# Patient Record
Sex: FEMALE | Race: WHITE | Employment: OTHER | URBAN - METROPOLITAN AREA
[De-identification: names, ages, dates, MRNs, and addresses within clinical notes are randomized per-mention and may not be internally consistent; named-entity substitution may affect disease eponyms.]

---

## 2017-12-21 ENCOUNTER — HOSPITAL ENCOUNTER (OUTPATIENT)
Dept: MRI IMAGING | Age: 72
Discharge: HOME | End: 2017-12-21
Payer: MEDICARE

## 2017-12-21 VITALS
RESPIRATION RATE: 18 BRPM | DIASTOLIC BLOOD PRESSURE: 74 MMHG | SYSTOLIC BLOOD PRESSURE: 149 MMHG | HEART RATE: 68 BPM | OXYGEN SATURATION: 95 %

## 2017-12-21 DIAGNOSIS — M54.16 LUMBAR RADICULOPATHY: ICD-10-CM

## 2017-12-21 DIAGNOSIS — M54.50 LOW BACK PAIN: ICD-10-CM

## 2017-12-21 PROCEDURE — 72148 MRI LUMBAR SPINE W/O DYE: CPT

## 2017-12-21 NOTE — PROGRESS NOTES
[employer] interrogated pacemaker before and after lumbar MRI. Patient monitored throughout. Denied any pain or shortness of breath.

## 2019-01-07 ENCOUNTER — HOSPITAL ENCOUNTER (OUTPATIENT)
Dept: MRI IMAGING | Age: 74
Discharge: HOME | End: 2019-01-07
Attending: ORTHOPAEDIC SURGERY
Payer: MEDICARE

## 2019-01-07 DIAGNOSIS — M54.14 THORACIC RADICULITIS: ICD-10-CM

## 2019-01-07 PROCEDURE — 72146 MRI CHEST SPINE W/O DYE: CPT

## 2020-12-16 VITALS
WEIGHT: 163.8 LBS | BODY MASS INDEX: 29.02 KG/M2 | SYSTOLIC BLOOD PRESSURE: 122 MMHG | DIASTOLIC BLOOD PRESSURE: 66 MMHG | HEIGHT: 63 IN

## 2020-12-16 RX ORDER — SPIRONOLACTONE 25 MG/1
TABLET ORAL DAILY
COMMUNITY

## 2020-12-16 RX ORDER — TELMISARTAN 80 MG/1
80 TABLET ORAL DAILY
COMMUNITY

## 2020-12-16 RX ORDER — LEVOTHYROXINE SODIUM 25 UG/1
38 TABLET ORAL
COMMUNITY

## 2020-12-16 RX ORDER — CYCLOBENZAPRINE HCL 10 MG
TABLET ORAL
COMMUNITY

## 2020-12-16 RX ORDER — NEBIVOLOL 5 MG/1
TABLET ORAL DAILY
COMMUNITY

## 2020-12-16 RX ORDER — NEBIVOLOL 10 MG/1
TABLET ORAL DAILY
COMMUNITY

## 2020-12-16 RX ORDER — FLUOROURACIL 50 MG/G
CREAM TOPICAL 2 TIMES DAILY
COMMUNITY

## 2020-12-16 RX ORDER — AMOXICILLIN 500 MG/1
500 CAPSULE ORAL
COMMUNITY

## 2020-12-16 RX ORDER — CLOMIPRAMINE HYDROCHLORIDE 25 MG/1
25 CAPSULE ORAL
COMMUNITY

## 2020-12-16 RX ORDER — MELOXICAM 15 MG/1
15 TABLET ORAL DAILY
COMMUNITY

## 2020-12-16 RX ORDER — ESTRADIOL 1 MG/1
1 TABLET ORAL DAILY
COMMUNITY
End: 2021-07-20 | Stop reason: SDUPTHER

## 2020-12-16 RX ORDER — FUROSEMIDE 20 MG/1
TABLET ORAL DAILY
COMMUNITY

## 2020-12-16 RX ORDER — INFLUENZA A VIRUS A/MICHIGAN/45/2015 X-275 (H1N1) ANTIGEN (FORMALDEHYDE INACTIVATED), INFLUENZA A VIRUS A/SINGAPORE/INFIMH-16-0019/2016 IVR-186 (H3N2) ANTIGEN (FORMALDEHYDE INACTIVATED), AND INFLUENZA B VIRUS B/MARYLAND/15/2016 BX-69A (A B/COLORADO/6/2017-LIKE VIRUS) ANTIGEN (FORMALDEHYDE INACTIVATED) 60; 60; 60 UG/.5ML; UG/.5ML; UG/.5ML
0.5 INJECTION, SUSPENSION INTRAMUSCULAR
COMMUNITY

## 2020-12-16 RX ORDER — POLYETHYLENE GLYCOL 3350, SODIUM CHLORIDE, SODIUM BICARBONATE, POTASSIUM CHLORIDE 420; 11.2; 5.72; 1.48 G/4L; G/4L; G/4L; G/4L
4000 POWDER, FOR SOLUTION ORAL
COMMUNITY

## 2020-12-16 RX ORDER — PAROXETINE 10 MG/1
TABLET, FILM COATED ORAL DAILY
COMMUNITY

## 2020-12-16 RX ORDER — OMEPRAZOLE 40 MG/1
40 CAPSULE, DELAYED RELEASE ORAL DAILY
COMMUNITY

## 2020-12-16 RX ORDER — ALPRAZOLAM 0.25 MG/1
TABLET ORAL
COMMUNITY

## 2020-12-16 RX ORDER — FLECAINIDE ACETATE 150 MG/1
150 TABLET ORAL 2 TIMES DAILY
COMMUNITY

## 2021-07-20 ENCOUNTER — OFFICE VISIT (OUTPATIENT)
Dept: OBGYN CLINIC | Age: 76
End: 2021-07-20
Payer: MEDICARE

## 2021-07-20 VITALS
HEIGHT: 63 IN | OXYGEN SATURATION: 96 % | SYSTOLIC BLOOD PRESSURE: 135 MMHG | HEART RATE: 67 BPM | BODY MASS INDEX: 31.18 KG/M2 | DIASTOLIC BLOOD PRESSURE: 64 MMHG | WEIGHT: 176 LBS

## 2021-07-20 DIAGNOSIS — Z01.419 ROUTINE GYNECOLOGICAL EXAMINATION: Primary | ICD-10-CM

## 2021-07-20 PROCEDURE — 1101F PT FALLS ASSESS-DOCD LE1/YR: CPT | Performed by: OBSTETRICS & GYNECOLOGY

## 2021-07-20 PROCEDURE — G8510 SCR DEP NEG, NO PLAN REQD: HCPCS | Performed by: OBSTETRICS & GYNECOLOGY

## 2021-07-20 PROCEDURE — G8417 CALC BMI ABV UP PARAM F/U: HCPCS | Performed by: OBSTETRICS & GYNECOLOGY

## 2021-07-20 PROCEDURE — 3017F COLORECTAL CA SCREEN DOC REV: CPT | Performed by: OBSTETRICS & GYNECOLOGY

## 2021-07-20 PROCEDURE — G0101 CA SCREEN;PELVIC/BREAST EXAM: HCPCS | Performed by: OBSTETRICS & GYNECOLOGY

## 2021-07-20 PROCEDURE — 1090F PRES/ABSN URINE INCON ASSESS: CPT | Performed by: OBSTETRICS & GYNECOLOGY

## 2021-07-20 RX ORDER — ATORVASTATIN CALCIUM 10 MG/1
TABLET, FILM COATED ORAL
COMMUNITY
Start: 2021-05-11

## 2021-07-20 RX ORDER — ESTRADIOL 1 MG/1
1 TABLET ORAL DAILY
Qty: 90 TABLET | Refills: 3 | Status: SHIPPED | OUTPATIENT
Start: 2021-07-20

## 2021-07-20 NOTE — PROGRESS NOTES
HISTORY OF PRESENT ILLNESS  Enrique Boone is a 76 y.o. female who presents today for the following:  Chief Complaint   Patient presents with    Annual Exam   Is a 77-year-old G0 female who presents today for routine annual exam.  She is without complaints on presentation today. She is using estrogen replacement therapy for menopausal symptoms and wishes to continue.     No Known Allergies    Current Outpatient Medications   Medication Sig    atorvastatin (LIPITOR) 10 mg tablet     ALPRAZolam (XANAX) 0.25 mg tablet Take  by mouth.  nebivoloL (Bystolic) 10 mg tablet Take  by mouth daily.  CHOLECALCIFEROL, VITAMIN D3, PO Take  by mouth.  estradioL (ESTRACE) 1 mg tablet Take 1 mg by mouth daily.  flecainide (TAMBOCOR) 150 mg tablet Take 150 mg by mouth two (2) times a day.  furosemide (LASIX) 20 mg tablet Take  by mouth daily.  SPIRONOLACTONE PO Take  by mouth.  telmisartan (MICARDIS) 80 mg tablet Take 80 mg by mouth daily.  rivaroxaban (Xarelto) 20 mg tab tablet Take  by mouth daily.  amoxicillin (AMOXIL) 500 mg capsule Take 500 mg by mouth.  amoxicillin/potassium clav (AMOXICILLIN-POT CLAVULANATE PO) Take  by mouth.  nebivoloL (Bystolic) 5 mg tablet Take  by mouth daily. (Patient not taking: Reported on 2021)    clomiPRAMINE (ANAFRANIL) 25 mg capsule Take 25 mg by mouth nightly. (Patient not taking: Reported on 2021)    cyclobenzaprine (FLEXERIL) 10 mg tablet Take  by mouth three (3) times daily as needed for Muscle Spasm(s). (Patient not taking: Reported on 2021)    fluorouraciL (EFUDEX) 5 % chemo cream Apply  to affected area two (2) times a day.  influenza vaccine , 65 yrs+,,PF, (Fluzone High-Dose , PF,) syrg injection 0.5 mL by IntraMUSCular route PRIOR TO DISCHARGE.  levothyroxine (SYNTHROID) 25 mcg tablet Take  by mouth Daily (before breakfast).  meloxicam (MOBIC) 15 mg tablet Take 15 mg by mouth daily.     omeprazole (PRILOSEC) 40 mg capsule Take 40 mg by mouth daily. (Patient not taking: Reported on 7/20/2021)    PARoxetine (PAXIL) 10 mg tablet Take  by mouth daily. (Patient not taking: Reported on 7/20/2021)    peg-electrolyte soln (NULYTELY) 420 gram solution Take 4,000 mL by mouth now.  RANOLAZINE PO Take  by mouth.  spironolactone (ALDACTONE) 25 mg tablet Take  by mouth daily. No current facility-administered medications for this visit. Past Medical History:   Diagnosis Date    Anxiety disorder     Arthritis     Asthma     GERD (gastroesophageal reflux disease)     Heart disease     Atrial Fibrillation    Hx of long term use of blood thinners     Hypertension     Thyroid disease        Past Surgical History:   Procedure Laterality Date    HX COLONOSCOPY      HX ORTHOPAEDIC      Total knee replacement    DC ABDOMEN SURGERY PROC UNLISTED      Hysterectomy- BSO    DC CARDIAC SURG PROCEDURE UNLIST      Pacemaker procedure       Family History   Problem Relation Age of Onset    Heart Disease Mother     Heart Disease Father     Cancer Maternal Aunt        Social History     Socioeconomic History    Marital status:      Spouse name: Not on file    Number of children: Not on file    Years of education: Not on file    Highest education level: Not on file   Occupational History    Not on file   Tobacco Use    Smoking status: Never Smoker    Smokeless tobacco: Never Used   Substance and Sexual Activity    Alcohol use: Not Currently    Drug use: Never    Sexual activity: Not Currently   Other Topics Concern    Not on file   Social History Narrative    Not on file     Social Determinants of Health     Financial Resource Strain:     Difficulty of Paying Living Expenses:    Food Insecurity:     Worried About Running Out of Food in the Last Year:     Ran Out of Food in the Last Year:    Transportation Needs:     Lack of Transportation (Medical):      Lack of Transportation (Non-Medical):    Physical Activity:     Days of Exercise per Week:     Minutes of Exercise per Session:    Stress:     Feeling of Stress :    Social Connections:     Frequency of Communication with Friends and Family:     Frequency of Social Gatherings with Friends and Family:     Attends Congregational Services:     Active Member of Clubs or Organizations:     Attends Club or Organization Meetings:     Marital Status:    Intimate Partner Violence:     Fear of Current or Ex-Partner:     Emotionally Abused:     Physically Abused:     Sexually Abused:            REVIEW OF SYSTEMS     Constitutional: Negative for chills, fever and malaise/fatigue. HENT: Negative for congestion, hearing loss and sore throat. Respiratory: Negative for cough, sputum production, shortness of breath and wheezing. Cardiovascular: Negative for chest pain. Gastrointestinal: Negative for abdominal pain, constipation, diarrhea, nausea and vomiting. Genitourinary: Negative for dysuria, flank pain, hematuria and urgency. Neurological: Negative for dizziness, loss of consciousness, weakness and headaches. Psychiatric/Behavioral: Negative for depression.      PHYSICAL EXAM  /64 (BP 1 Location: Left upper arm, BP Patient Position: Sitting, BP Cuff Size: Small adult)   Pulse 67   Ht 5' 2.7\" (1.593 m)   Wt 176 lb (79.8 kg)   SpO2 96%   BMI 31.48 kg/m²      Patient is a well-developed well-nourished female no apparent distress  She is alert and oriented x3  Head is normocephalic atraumatic pupils equal round react light accommodation  Neck is supple without adenopathy or thyromegaly  Heart is with regular rate and rhythm without murmurs rubs or gallops  Lungs are clear to auscultation and percussion bilaterally  Breasts are without masses bilaterally  Abdomen is soft nontender nondistended bowel sounds are present and active  Extremities are without clubbing cyanosis or edema  Pulses are full and symmetric bilaterally  Pelvic  External genitalia within normal limits  Urethra is midline there are no apparent urethral lesions the bladder is within normal limits  Vagina is with normal rugae there is minimal discharge present in the vaginal vault  Cervix is surgically absent  Uterus is surgically absent  Adnexa are without masses    ASSESSMENT and PLAN  Normal annual exam  Plan: Estradiol refilled, mammogram ordered, follow-up as needed and yearly  No results found for this visit on 07/20/21.     Orders Placed This Encounter    atorvastatin (LIPITOR) 10 mg tablet

## 2022-03-15 ENCOUNTER — HOSPITAL ENCOUNTER (OUTPATIENT)
Dept: CARDIAC REHAB | Age: 77
Discharge: HOME | End: 2022-03-15
Payer: MEDICARE

## 2022-03-15 PROCEDURE — 93797 PHYS/QHP OP CAR RHAB WO ECG: CPT

## 2022-03-15 PROCEDURE — 93798 PHYS/QHP OP CAR RHAB W/ECG: CPT

## 2022-03-15 NOTE — CARDIO/PULMONARY
Jaron Stephens   68 y.o. Presented to cardiac rehab for orientation and exercise tolerance test today with a primary diagnosis of s/p MVR(1/21/22). Jaron Stephens has a history of mitral regurgitation, atrial fib., modified cryomaze procedure, ligation of HALINA w/40 mm Atriclip(1/21/22), anxiety, arthritis, asthma, GERD, HTN, HLD, Thyroid disease, Right TKR, pacemaker implantation. Cardiac risk factors include HTN, HLD, stress, family hx., sedentary lifestyle. Jaron Stehpens is  and lives with her spouse. Pt. has a social hx. Of former smoker(quit 1986), no current alcohol use, and no hx. Of substance use. PHQ9, depression score, is 9 and this is considered mild. The result was discussed with patient who affirms score to be accurate and results were sent to pt.'s PCP Dr. Cortez Ly on 3/15/22. Patient denied chest pain during 6 minute walk test and was in Sinus rhythm/Sinus Tachycardia w/occasional ectopy. Exercise prescription developed around patient stated goals, to be supplemented with home exercise recommendations. EF is 55%. Education manual given to patient and reviewed. Patient will attend cardiac rehab 2-3 times/week and education.

## 2022-03-16 ENCOUNTER — HOSPITAL ENCOUNTER (OUTPATIENT)
Dept: CARDIAC REHAB | Age: 77
Discharge: HOME | End: 2022-03-16
Payer: MEDICARE

## 2022-03-16 VITALS — HEIGHT: 63 IN | WEIGHT: 181.4 LBS | BODY MASS INDEX: 32.14 KG/M2

## 2022-03-16 PROCEDURE — 93798 PHYS/QHP OP CAR RHAB W/ECG: CPT

## 2022-03-16 PROCEDURE — 93797 PHYS/QHP OP CAR RHAB WO ECG: CPT

## 2022-03-16 RX ORDER — GABAPENTIN 300 MG/1
300 CAPSULE ORAL 2 TIMES DAILY
COMMUNITY

## 2022-03-16 RX ORDER — FLUTICASONE PROPIONATE 50 MCG
SPRAY, SUSPENSION (ML) NASAL AS NEEDED
COMMUNITY

## 2022-03-16 RX ORDER — CALCIUM CARBONATE 600 MG
600 TABLET ORAL DAILY
COMMUNITY

## 2022-03-16 RX ORDER — GLUCOSAMINE/CHONDR SU A SOD 750-600 MG
1000 TABLET ORAL
COMMUNITY

## 2022-03-16 RX ORDER — ACETAMINOPHEN 500 MG
1000 TABLET ORAL
COMMUNITY

## 2022-03-16 RX ORDER — ASCORBIC ACID 500 MG
1000 TABLET ORAL DAILY
COMMUNITY

## 2022-03-16 RX ORDER — ASPIRIN 81 MG/1
81 TABLET ORAL DAILY
COMMUNITY

## 2022-03-16 RX ORDER — BISMUTH SUBSALICYLATE 262 MG
1 TABLET,CHEWABLE ORAL DAILY
COMMUNITY

## 2022-03-16 RX ORDER — WARFARIN 2 MG/1
2 TABLET ORAL DAILY
COMMUNITY

## 2022-03-17 ENCOUNTER — APPOINTMENT (OUTPATIENT)
Dept: CARDIAC REHAB | Age: 77
End: 2022-03-17
Payer: MEDICARE

## 2022-03-17 NOTE — CARDIO/PULMONARY
Patient called via telephone this morning stating she will not continue with the program due to musculoskeletal problems. Patient was informed that her order for cardiac rehab is good for a year and if she feels better she can revisit continuing at a later date.

## 2022-03-21 ENCOUNTER — APPOINTMENT (OUTPATIENT)
Dept: CARDIAC REHAB | Age: 77
End: 2022-03-21
Payer: MEDICARE

## 2022-03-23 ENCOUNTER — APPOINTMENT (OUTPATIENT)
Dept: CARDIAC REHAB | Age: 77
End: 2022-03-23
Payer: MEDICARE

## 2022-03-24 ENCOUNTER — APPOINTMENT (OUTPATIENT)
Dept: CARDIAC REHAB | Age: 77
End: 2022-03-24
Payer: MEDICARE

## 2022-03-28 ENCOUNTER — APPOINTMENT (OUTPATIENT)
Dept: CARDIAC REHAB | Age: 77
End: 2022-03-28
Payer: MEDICARE

## 2022-03-30 ENCOUNTER — APPOINTMENT (OUTPATIENT)
Dept: CARDIAC REHAB | Age: 77
End: 2022-03-30
Payer: MEDICARE

## 2022-03-31 ENCOUNTER — APPOINTMENT (OUTPATIENT)
Dept: CARDIAC REHAB | Age: 77
End: 2022-03-31
Payer: MEDICARE

## 2022-04-04 ENCOUNTER — APPOINTMENT (OUTPATIENT)
Dept: CARDIAC REHAB | Age: 77
End: 2022-04-04

## 2022-04-06 ENCOUNTER — APPOINTMENT (OUTPATIENT)
Dept: CARDIAC REHAB | Age: 77
End: 2022-04-06

## 2022-04-07 ENCOUNTER — APPOINTMENT (OUTPATIENT)
Dept: CARDIAC REHAB | Age: 77
End: 2022-04-07

## 2022-04-11 ENCOUNTER — APPOINTMENT (OUTPATIENT)
Dept: CARDIAC REHAB | Age: 77
End: 2022-04-11

## 2022-04-13 ENCOUNTER — APPOINTMENT (OUTPATIENT)
Dept: CARDIAC REHAB | Age: 77
End: 2022-04-13

## 2022-04-14 ENCOUNTER — APPOINTMENT (OUTPATIENT)
Dept: CARDIAC REHAB | Age: 77
End: 2022-04-14

## 2022-04-18 ENCOUNTER — APPOINTMENT (OUTPATIENT)
Dept: CARDIAC REHAB | Age: 77
End: 2022-04-18

## 2022-04-20 ENCOUNTER — APPOINTMENT (OUTPATIENT)
Dept: CARDIAC REHAB | Age: 77
End: 2022-04-20

## 2022-04-21 ENCOUNTER — APPOINTMENT (OUTPATIENT)
Dept: CARDIAC REHAB | Age: 77
End: 2022-04-21

## 2022-04-25 ENCOUNTER — APPOINTMENT (OUTPATIENT)
Dept: CARDIAC REHAB | Age: 77
End: 2022-04-25

## 2022-04-27 ENCOUNTER — APPOINTMENT (OUTPATIENT)
Dept: CARDIAC REHAB | Age: 77
End: 2022-04-27

## 2022-04-28 ENCOUNTER — APPOINTMENT (OUTPATIENT)
Dept: CARDIAC REHAB | Age: 77
End: 2022-04-28

## 2022-05-02 ENCOUNTER — APPOINTMENT (OUTPATIENT)
Dept: CARDIAC REHAB | Age: 77
End: 2022-05-02

## 2022-05-04 ENCOUNTER — APPOINTMENT (OUTPATIENT)
Dept: CARDIAC REHAB | Age: 77
End: 2022-05-04

## 2022-05-05 ENCOUNTER — APPOINTMENT (OUTPATIENT)
Dept: CARDIAC REHAB | Age: 77
End: 2022-05-05

## 2022-05-09 ENCOUNTER — APPOINTMENT (OUTPATIENT)
Dept: CARDIAC REHAB | Age: 77
End: 2022-05-09

## 2022-05-11 ENCOUNTER — APPOINTMENT (OUTPATIENT)
Dept: CARDIAC REHAB | Age: 77
End: 2022-05-11

## 2022-05-12 ENCOUNTER — APPOINTMENT (OUTPATIENT)
Dept: CARDIAC REHAB | Age: 77
End: 2022-05-12

## 2022-05-18 ENCOUNTER — APPOINTMENT (OUTPATIENT)
Dept: CARDIAC REHAB | Age: 77
End: 2022-05-18

## 2022-10-03 RX ORDER — ESTRADIOL 1 MG/1
TABLET ORAL
Qty: 90 TABLET | Refills: 3 | OUTPATIENT
Start: 2022-10-03

## 2023-07-11 ENCOUNTER — TELEPHONE (OUTPATIENT)
Age: 78
End: 2023-07-11

## 2023-07-11 NOTE — TELEPHONE ENCOUNTER
07/11/23 10:16A R/T call to the patient as she left a  about being scheduled as a new patient---spw pt informed that Dr. Ally Toure are both physicians here as patient was informed Dr. Ally Loredo had retired----informed patient annual exam is not due until 07/21/2023 and mammo can be completed yearly, on the off year she is able to get a medication refill---patient informed she will call back to be scheduled as she has lots going on at the moment but wanted to be informed about her eligibility to have her annual & mammo scheduled----also informed patient only 2D mammos can be scheduled here as well--patient left her number [phone].

## 2024-11-11 ENCOUNTER — TELEPHONE (OUTPATIENT)
Age: 79
End: 2024-11-11

## 2024-11-11 NOTE — TELEPHONE ENCOUNTER
11/11/2024 @1:22pm-Called [phone] and L/M on VM to have patient contact the office at [phone] regarding message she left on VM to possibly schedule an appt.  At 1:23pm-Called [phone] and L/M on VM to have patient contact the office at [phone] regarding message she left on VM to possibly schedule an appt.ww

## 2024-11-19 ENCOUNTER — TELEPHONE (OUTPATIENT)
Age: 79
End: 2024-11-19

## 2024-11-19 NOTE — TELEPHONE ENCOUNTER
Patient callled on 11/18/2024 and left a voicemail requesting to schedule an appointment. Returned the patient's call, left a message to schedule an appointment.

## 2025-08-20 ENCOUNTER — TELEPHONE (OUTPATIENT)
Age: 80
End: 2025-08-20